# Patient Record
Sex: MALE | Race: OTHER | HISPANIC OR LATINO | URBAN - METROPOLITAN AREA
[De-identification: names, ages, dates, MRNs, and addresses within clinical notes are randomized per-mention and may not be internally consistent; named-entity substitution may affect disease eponyms.]

---

## 2017-01-09 ENCOUNTER — EMERGENCY (EMERGENCY)
Facility: HOSPITAL | Age: 52
LOS: 0 days | Discharge: HOME | End: 2017-01-09

## 2017-06-27 DIAGNOSIS — R10.32 LEFT LOWER QUADRANT PAIN: ICD-10-CM

## 2017-06-27 DIAGNOSIS — K57.92 DIVERTICULITIS OF INTESTINE, PART UNSPECIFIED, WITHOUT PERFORATION OR ABSCESS WITHOUT BLEEDING: ICD-10-CM

## 2017-08-26 ENCOUNTER — EMERGENCY (EMERGENCY)
Facility: HOSPITAL | Age: 52
LOS: 0 days | Discharge: HOME | End: 2017-08-26

## 2017-08-26 DIAGNOSIS — R10.32 LEFT LOWER QUADRANT PAIN: ICD-10-CM

## 2017-08-26 DIAGNOSIS — K57.32 DIVERTICULITIS OF LARGE INTESTINE WITHOUT PERFORATION OR ABSCESS WITHOUT BLEEDING: ICD-10-CM

## 2017-11-13 ENCOUNTER — EMERGENCY (EMERGENCY)
Facility: HOSPITAL | Age: 52
LOS: 0 days | Discharge: HOME | End: 2017-11-13

## 2017-11-13 DIAGNOSIS — K57.32 DIVERTICULITIS OF LARGE INTESTINE WITHOUT PERFORATION OR ABSCESS WITHOUT BLEEDING: ICD-10-CM

## 2017-11-13 DIAGNOSIS — R10.32 LEFT LOWER QUADRANT PAIN: ICD-10-CM

## 2019-08-07 ENCOUNTER — EMERGENCY (EMERGENCY)
Facility: HOSPITAL | Age: 54
LOS: 0 days | Discharge: HOME | End: 2019-08-07
Attending: EMERGENCY MEDICINE | Admitting: EMERGENCY MEDICINE
Payer: MEDICAID

## 2019-08-07 VITALS
HEART RATE: 79 BPM | DIASTOLIC BLOOD PRESSURE: 75 MMHG | SYSTOLIC BLOOD PRESSURE: 134 MMHG | OXYGEN SATURATION: 95 % | TEMPERATURE: 99 F | RESPIRATION RATE: 16 BRPM

## 2019-08-07 DIAGNOSIS — R30.0 DYSURIA: ICD-10-CM

## 2019-08-07 DIAGNOSIS — R10.9 UNSPECIFIED ABDOMINAL PAIN: ICD-10-CM

## 2019-08-07 DIAGNOSIS — R10.32 LEFT LOWER QUADRANT PAIN: ICD-10-CM

## 2019-08-07 LAB
ALBUMIN SERPL ELPH-MCNC: 4.3 G/DL — SIGNIFICANT CHANGE UP (ref 3.5–5.2)
ALP SERPL-CCNC: 100 U/L — SIGNIFICANT CHANGE UP (ref 30–115)
ALT FLD-CCNC: 68 U/L — HIGH (ref 0–41)
ANION GAP SERPL CALC-SCNC: 11 MMOL/L — SIGNIFICANT CHANGE UP (ref 7–14)
APPEARANCE UR: CLEAR — SIGNIFICANT CHANGE UP
AST SERPL-CCNC: 52 U/L — HIGH (ref 0–41)
BASOPHILS # BLD AUTO: 0.04 K/UL — SIGNIFICANT CHANGE UP (ref 0–0.2)
BASOPHILS NFR BLD AUTO: 0.7 % — SIGNIFICANT CHANGE UP (ref 0–1)
BILIRUB DIRECT SERPL-MCNC: <0.2 MG/DL — SIGNIFICANT CHANGE UP (ref 0–0.2)
BILIRUB INDIRECT FLD-MCNC: >0.1 MG/DL — LOW (ref 0.2–1.2)
BILIRUB SERPL-MCNC: 0.3 MG/DL — SIGNIFICANT CHANGE UP (ref 0.2–1.2)
BILIRUB UR-MCNC: NEGATIVE — SIGNIFICANT CHANGE UP
BUN SERPL-MCNC: 18 MG/DL — SIGNIFICANT CHANGE UP (ref 10–20)
CALCIUM SERPL-MCNC: 9 MG/DL — SIGNIFICANT CHANGE UP (ref 8.5–10.1)
CHLORIDE SERPL-SCNC: 106 MMOL/L — SIGNIFICANT CHANGE UP (ref 98–110)
CO2 SERPL-SCNC: 23 MMOL/L — SIGNIFICANT CHANGE UP (ref 17–32)
COLOR SPEC: SIGNIFICANT CHANGE UP
CREAT SERPL-MCNC: 0.9 MG/DL — SIGNIFICANT CHANGE UP (ref 0.7–1.5)
DIFF PNL FLD: NEGATIVE — SIGNIFICANT CHANGE UP
EOSINOPHIL # BLD AUTO: 0.18 K/UL — SIGNIFICANT CHANGE UP (ref 0–0.7)
EOSINOPHIL NFR BLD AUTO: 3.1 % — SIGNIFICANT CHANGE UP (ref 0–8)
GLUCOSE SERPL-MCNC: 110 MG/DL — HIGH (ref 70–99)
GLUCOSE UR QL: NEGATIVE — SIGNIFICANT CHANGE UP
HCT VFR BLD CALC: 43.5 % — SIGNIFICANT CHANGE UP (ref 42–52)
HGB BLD-MCNC: 15 G/DL — SIGNIFICANT CHANGE UP (ref 14–18)
IMM GRANULOCYTES NFR BLD AUTO: 0.3 % — SIGNIFICANT CHANGE UP (ref 0.1–0.3)
KETONES UR-MCNC: NEGATIVE — SIGNIFICANT CHANGE UP
LACTATE SERPL-SCNC: 1.4 MMOL/L — SIGNIFICANT CHANGE UP (ref 0.5–2.2)
LEUKOCYTE ESTERASE UR-ACNC: NEGATIVE — SIGNIFICANT CHANGE UP
LIDOCAIN IGE QN: 41 U/L — SIGNIFICANT CHANGE UP (ref 7–60)
LYMPHOCYTES # BLD AUTO: 1.79 K/UL — SIGNIFICANT CHANGE UP (ref 1.2–3.4)
LYMPHOCYTES # BLD AUTO: 31.2 % — SIGNIFICANT CHANGE UP (ref 20.5–51.1)
MCHC RBC-ENTMCNC: 30.9 PG — SIGNIFICANT CHANGE UP (ref 27–31)
MCHC RBC-ENTMCNC: 34.5 G/DL — SIGNIFICANT CHANGE UP (ref 32–37)
MCV RBC AUTO: 89.5 FL — SIGNIFICANT CHANGE UP (ref 80–94)
MONOCYTES # BLD AUTO: 0.45 K/UL — SIGNIFICANT CHANGE UP (ref 0.1–0.6)
MONOCYTES NFR BLD AUTO: 7.8 % — SIGNIFICANT CHANGE UP (ref 1.7–9.3)
NEUTROPHILS # BLD AUTO: 3.26 K/UL — SIGNIFICANT CHANGE UP (ref 1.4–6.5)
NEUTROPHILS NFR BLD AUTO: 56.9 % — SIGNIFICANT CHANGE UP (ref 42.2–75.2)
NITRITE UR-MCNC: NEGATIVE — SIGNIFICANT CHANGE UP
NRBC # BLD: 0 /100 WBCS — SIGNIFICANT CHANGE UP (ref 0–0)
PH UR: 6.5 — SIGNIFICANT CHANGE UP (ref 5–8)
PLATELET # BLD AUTO: 179 K/UL — SIGNIFICANT CHANGE UP (ref 130–400)
POTASSIUM SERPL-MCNC: 4.4 MMOL/L — SIGNIFICANT CHANGE UP (ref 3.5–5)
POTASSIUM SERPL-SCNC: 4.4 MMOL/L — SIGNIFICANT CHANGE UP (ref 3.5–5)
PROT SERPL-MCNC: 6.9 G/DL — SIGNIFICANT CHANGE UP (ref 6–8)
PROT UR-MCNC: NEGATIVE — SIGNIFICANT CHANGE UP
RBC # BLD: 4.86 M/UL — SIGNIFICANT CHANGE UP (ref 4.7–6.1)
RBC # FLD: 12.4 % — SIGNIFICANT CHANGE UP (ref 11.5–14.5)
SODIUM SERPL-SCNC: 140 MMOL/L — SIGNIFICANT CHANGE UP (ref 135–146)
SP GR SPEC: 1.01 — SIGNIFICANT CHANGE UP (ref 1.01–1.02)
UROBILINOGEN FLD QL: SIGNIFICANT CHANGE UP
WBC # BLD: 5.74 K/UL — SIGNIFICANT CHANGE UP (ref 4.8–10.8)
WBC # FLD AUTO: 5.74 K/UL — SIGNIFICANT CHANGE UP (ref 4.8–10.8)

## 2019-08-07 PROCEDURE — 74177 CT ABD & PELVIS W/CONTRAST: CPT | Mod: 26

## 2019-08-07 PROCEDURE — 99284 EMERGENCY DEPT VISIT MOD MDM: CPT

## 2019-08-07 RX ORDER — SODIUM CHLORIDE 9 MG/ML
1000 INJECTION INTRAMUSCULAR; INTRAVENOUS; SUBCUTANEOUS
Refills: 0 | Status: DISCONTINUED | OUTPATIENT
Start: 2019-08-07 | End: 2019-08-07

## 2019-08-07 RX ADMIN — SODIUM CHLORIDE 1000 MILLILITER(S): 9 INJECTION INTRAMUSCULAR; INTRAVENOUS; SUBCUTANEOUS at 19:45

## 2019-08-07 RX ADMIN — SODIUM CHLORIDE 250 MILLILITER(S): 9 INJECTION INTRAMUSCULAR; INTRAVENOUS; SUBCUTANEOUS at 16:17

## 2019-08-07 NOTE — ED PROVIDER NOTE - CLINICAL SUMMARY MEDICAL DECISION MAKING FREE TEXT BOX
pt with c/o lower abd pain waxing and waning for 1-2 weeks.  ua - neg.  labs reviewed, mild elevation of LFT's.  CT abd - no acute pathology, + diverticulosis, no diverticulitis, + hepatic steatosis. Pt told of results of labs and CT scan, pt told he needs to f/u with a GI doctor for elevated  lft's. also to f/u with his pmd and told to return to ER for any new/concerning symptoms. pt understands and agrees with plan.    *Above explained to pt with Turkish translation from [place]  # [id number]

## 2019-08-07 NOTE — ED ADULT NURSE REASSESSMENT NOTE - NS ED NURSE REASSESS COMMENT FT1
Patient assessed, abdominal pain improved but not resolved. Patient awaiting CT results. Review of systems WDL.

## 2019-08-07 NOTE — ED PROVIDER NOTE - NS ED ROS FT
Constitutional: no fever, chills, no recent weight loss, change in appetite or malaise  Cardiac: No chest pain, SOB or edema.  Respiratory: No cough or respiratory distress  GI: + lower abdominal pain. No nausea, vomiting, diarrhea  : + dysuria. No frequency, urgency or hematuria  MS: no pain to back or extremities, no loss of ROM, no weakness  Neuro: No headache or weakness. No LOC.  Skin: No skin rash.  Except as documented in the HPI, all other systems are negative.

## 2019-08-07 NOTE — ED PROVIDER NOTE - OBJECTIVE STATEMENT
54 year old M with no pmhx c/o lower abdominal pain x 1 week. Sts the pain is worse when he urinates. The pain is intermittent and worse after eating. Denies any nausea, vomiting, decreased po intake, diarrhea, hematuria, chest pain, sob, past surgical hx, rectal bleeding.

## 2019-08-07 NOTE — ED ADULT TRIAGE NOTE - CHIEF COMPLAINT QUOTE
lower abdominal pain x1 week associated with dysuria lower abdominal pain x1 week associated with dysuria. No N/V/D

## 2019-08-07 NOTE — ED PROVIDER NOTE - PHYSICAL EXAMINATION
CONSTITUTIONAL: Well-appearing; well-nourished; in no apparent distress.   NECK: Supple; non-tender; no cervical lymphadenopathy.   CARDIOVASCULAR: Normal S1, S2; no murmurs, rubs, or gallops.   RESPIRATORY: Normal chest excursion with respiration; breath sounds clear and equal bilaterally; no wheezes, rhonchi, or rales.  GI/: Normal bowel sounds; non-distended; non-tender; No rebound or guarding. No CVA ttp  MS: No evidence of trauma or deformity. Normal ROM in all four extremities; non-tender to palpation; distal pulses are normal.   SKIN: Normal for age and race; warm; dry; good turgor; no apparent lesions or exudate.   NEURO/PSYCH: A & O x 4; grossly unremarkable. mood and manner are appropriate.

## 2019-08-07 NOTE — ED PROVIDER NOTE - ATTENDING CONTRIBUTION TO CARE
55 y/o male with no h/o diverticulitis in ER with c/o lower abd pain x 1-2 weeks.  waxning and waning, dull pain to LLQ.  no radiation.  states his lower abdomen hurts when he urinates, but no burning, no frequency, no hematuria, no penile d/c, no pain/swelling to testicles.  no flank pain.  no n/v/d  no f/c.  pt sexually active with wife only.  pt states had similar episode ~ 1 yr ago and had diverticulitis.  No cp/sob.  no ha/dizziness/loc.  PE - nad, nc/at, eomi, perrl, op - clear, cta b/l, no w/r/r, rrr, abd - soft, mild lower abd tenderness, no guarding/rebound, nabs, no cvat, gu - no penile or testicular swelling or tenderness, no penile d/c, from x 4, A&O x 3, no focal neuro deficits.  -check labs, ua, ct scan, re-eval.

## 2019-08-07 NOTE — ED PROVIDER NOTE - CARE PROVIDER_API CALL
Tushar Buchanan)  Gastroenterology  [address] [place] [address]  Phone: [phone]  Fax: [phone]  Follow Up Time:

## 2019-08-08 LAB
CULTURE RESULTS: SIGNIFICANT CHANGE UP
SPECIMEN SOURCE: SIGNIFICANT CHANGE UP

## 2020-07-10 ENCOUNTER — APPOINTMENT (OUTPATIENT)
Dept: OTOLARYNGOLOGY | Facility: CLINIC | Age: 55
End: 2020-07-10
Payer: SUBSIDIZED

## 2020-07-10 DIAGNOSIS — H61.23 IMPACTED CERUMEN, BILATERAL: ICD-10-CM

## 2020-07-10 PROCEDURE — 92550 TYMPANOMETRY & REFLEX THRESH: CPT

## 2020-07-10 PROCEDURE — G0268 REMOVAL OF IMPACTED WAX MD: CPT

## 2020-07-10 PROCEDURE — 92557 COMPREHENSIVE HEARING TEST: CPT

## 2020-07-10 PROCEDURE — 99203 OFFICE O/P NEW LOW 30 MIN: CPT | Mod: 25

## 2020-07-10 NOTE — ASSESSMENT
[FreeTextEntry1] : Audiogram reviewed and discussed. \par \par Discussed hearing loss and noise protection.\par \par RTC for re-audio in 4M.\par

## 2020-07-10 NOTE — PHYSICAL EXAM
[Midline] : trachea located in midline position [Normal] : no rashes [de-identified] : bilateral cerumen impaction

## 2020-07-10 NOTE — HISTORY OF PRESENT ILLNESS
[de-identified] : Patient presents today with c/o hearing loss.He noticed hearing loss about 2 months ago. Patient denies any otalgia. No tinnitus. He admits right ear is worse. No pain.

## 2020-07-24 ENCOUNTER — OUTPATIENT (OUTPATIENT)
Dept: OUTPATIENT SERVICES | Facility: HOSPITAL | Age: 55
LOS: 1 days | Discharge: HOME | End: 2020-07-24

## 2020-07-24 ENCOUNTER — APPOINTMENT (OUTPATIENT)
Dept: INTERNAL MEDICINE | Facility: CLINIC | Age: 55
End: 2020-07-24
Payer: COMMERCIAL

## 2020-07-24 VITALS
HEART RATE: 63 BPM | BODY MASS INDEX: 20.22 KG/M2 | TEMPERATURE: 96.1 F | DIASTOLIC BLOOD PRESSURE: 73 MMHG | SYSTOLIC BLOOD PRESSURE: 128 MMHG | HEIGHT: 76 IN | WEIGHT: 166 LBS

## 2020-07-24 DIAGNOSIS — H54.7 UNSPECIFIED VISUAL LOSS: ICD-10-CM

## 2020-07-24 DIAGNOSIS — Z78.9 OTHER SPECIFIED HEALTH STATUS: ICD-10-CM

## 2020-07-24 PROCEDURE — 99204 OFFICE O/P NEW MOD 45 MIN: CPT | Mod: GC

## 2020-07-25 LAB
25(OH)D3 SERPL-MCNC: 25 NG/ML
ALBUMIN SERPL ELPH-MCNC: 4.5 G/DL
ALP BLD-CCNC: 99 U/L
ALT SERPL-CCNC: 150 U/L
ANION GAP SERPL CALC-SCNC: 14 MMOL/L
AST SERPL-CCNC: 89 U/L
BASOPHILS # BLD AUTO: 0.03 K/UL
BASOPHILS NFR BLD AUTO: 0.8 %
BILIRUB SERPL-MCNC: 0.6 MG/DL
BUN SERPL-MCNC: 19 MG/DL
CALCIUM SERPL-MCNC: 9.1 MG/DL
CHLORIDE SERPL-SCNC: 103 MMOL/L
CHOLEST SERPL-MCNC: 221 MG/DL
CHOLEST/HDLC SERPL: 4.2 RATIO
CO2 SERPL-SCNC: 23 MMOL/L
CREAT SERPL-MCNC: 1 MG/DL
EOSINOPHIL # BLD AUTO: 0.14 K/UL
EOSINOPHIL NFR BLD AUTO: 3.7 %
ESTIMATED AVERAGE GLUCOSE: 128 MG/DL
GLUCOSE SERPL-MCNC: 118 MG/DL
HBA1C MFR BLD HPLC: 6.1 %
HCT VFR BLD CALC: 46.4 %
HDLC SERPL-MCNC: 53 MG/DL
HGB BLD-MCNC: 15.6 G/DL
IMM GRANULOCYTES NFR BLD AUTO: 0 %
LDLC SERPL CALC-MCNC: 166 MG/DL
LYMPHOCYTES # BLD AUTO: 1.63 K/UL
LYMPHOCYTES NFR BLD AUTO: 42.6 %
MAN DIFF?: NORMAL
MCHC RBC-ENTMCNC: 30.4 PG
MCHC RBC-ENTMCNC: 33.6 G/DL
MCV RBC AUTO: 90.3 FL
MONOCYTES # BLD AUTO: 0.27 K/UL
MONOCYTES NFR BLD AUTO: 7 %
NEUTROPHILS # BLD AUTO: 1.76 K/UL
NEUTROPHILS NFR BLD AUTO: 45.9 %
PLATELET # BLD AUTO: 178 K/UL
POTASSIUM SERPL-SCNC: 4.2 MMOL/L
PROT SERPL-MCNC: 6.8 G/DL
RBC # BLD: 5.14 M/UL
RBC # FLD: 12.7 %
SODIUM SERPL-SCNC: 140 MMOL/L
TRIGL SERPL-MCNC: 177 MG/DL
TSH SERPL-ACNC: 1.55 UIU/ML
WBC # FLD AUTO: 3.83 K/UL

## 2020-07-25 NOTE — PHYSICAL EXAM
[Well Nourished] : well nourished [No Acute Distress] : no acute distress [Normal Sclera/Conjunctiva] : normal sclera/conjunctiva [Well-Appearing] : well-appearing [Well Developed] : well developed [PERRL] : pupils equal round and reactive to light [EOMI] : extraocular movements intact [Normal Outer Ear/Nose] : the outer ears and nose were normal in appearance [No JVD] : no jugular venous distention [Normal Oropharynx] : the oropharynx was normal [No Lymphadenopathy] : no lymphadenopathy [Thyroid Normal, No Nodules] : the thyroid was normal and there were no nodules present [Supple] : supple [No Respiratory Distress] : no respiratory distress  [No Accessory Muscle Use] : no accessory muscle use [Clear to Auscultation] : lungs were clear to auscultation bilaterally [Normal Rate] : normal rate  [Normal S1, S2] : normal S1 and S2 [Regular Rhythm] : with a regular rhythm [No Murmur] : no murmur heard [No Carotid Bruits] : no carotid bruits [No Abdominal Bruit] : a ~M bruit was not heard ~T in the abdomen [Pedal Pulses Present] : the pedal pulses are present [No Varicosities] : no varicosities [No Palpable Aorta] : no palpable aorta [No Edema] : there was no peripheral edema [Soft] : abdomen soft [No Extremity Clubbing/Cyanosis] : no extremity clubbing/cyanosis [Non Tender] : non-tender [Non-distended] : non-distended [No HSM] : no HSM [No Masses] : no abdominal mass palpated [Normal Bowel Sounds] : normal bowel sounds [Normal Posterior Cervical Nodes] : no posterior cervical lymphadenopathy [No Spinal Tenderness] : no spinal tenderness [No CVA Tenderness] : no CVA  tenderness [Normal Anterior Cervical Nodes] : no anterior cervical lymphadenopathy [No Joint Swelling] : no joint swelling [Grossly Normal Strength/Tone] : grossly normal strength/tone [No Rash] : no rash [No Focal Deficits] : no focal deficits [Coordination Grossly Intact] : coordination grossly intact [Deep Tendon Reflexes (DTR)] : deep tendon reflexes were 2+ and symmetric [Normal Gait] : normal gait [Normal Insight/Judgement] : insight and judgment were intact [Normal Affect] : the affect was normal [de-identified] : Visual loss in the right EYE

## 2020-07-25 NOTE — HISTORY OF PRESENT ILLNESS
[FreeTextEntry1] : establish care [de-identified] : 56 yo male with no PMH comes to the office to establish care.\par complains of urinary symptoms such as straining, thin stream, multiple nighttime awakening. No weight loss, no ca in family; no fever, burning, of flank pain or pus or blood in the urine.\par he also complains of decrease in vision in the right eye for last 3-4 years, gradual painless visual loss., no trauma or infection history.

## 2020-07-25 NOTE — REVIEW OF SYSTEMS
[Hesitancy] : hesitancy [Vision Problems] : vision problems [Hearing Loss] : hearing loss [Frequency] : frequency [Nocturia] : nocturia [Negative] : Heme/Lymph [FreeTextEntry3] : rt eye visual problem [FreeTextEntry4] : b/l cerumen

## 2020-07-25 NOTE — ASSESSMENT
[FreeTextEntry1] : Lower urinary tract symptoms:\par likely BPH\par get ua, US prostate\par start on tamsulosin\par \par Rt eye visual problem:\par painless, gradual\par needs ophthalmology eval\par \par HCM:\par routine labs\par f/u in 6 months

## 2020-07-30 DIAGNOSIS — H54.7 UNSPECIFIED VISUAL LOSS: ICD-10-CM

## 2020-07-30 DIAGNOSIS — Z00.00 ENCOUNTER FOR GENERAL ADULT MEDICAL EXAMINATION WITHOUT ABNORMAL FINDINGS: ICD-10-CM

## 2020-07-30 DIAGNOSIS — Z78.9 OTHER SPECIFIED HEALTH STATUS: ICD-10-CM

## 2020-07-30 DIAGNOSIS — R39.9 UNSPECIFIED SYMPTOMS AND SIGNS INVOLVING THE GENITOURINARY SYSTEM: ICD-10-CM

## 2020-08-07 ENCOUNTER — OUTPATIENT (OUTPATIENT)
Dept: OUTPATIENT SERVICES | Facility: HOSPITAL | Age: 55
LOS: 1 days | Discharge: HOME | End: 2020-08-07
Payer: SUBSIDIZED

## 2020-08-07 PROCEDURE — 92134 CPTRZ OPH DX IMG PST SGM RTA: CPT | Mod: 26

## 2020-08-07 PROCEDURE — 92202 OPSCPY EXTND ON/MAC DRAW: CPT

## 2020-08-07 PROCEDURE — 92004 COMPRE OPH EXAM NEW PT 1/>: CPT

## 2020-08-21 ENCOUNTER — OUTPATIENT (OUTPATIENT)
Dept: OUTPATIENT SERVICES | Facility: HOSPITAL | Age: 55
LOS: 1 days | Discharge: HOME | End: 2020-08-21
Payer: SUBSIDIZED

## 2020-08-21 PROCEDURE — 92012 INTRM OPH EXAM EST PATIENT: CPT

## 2020-11-13 ENCOUNTER — APPOINTMENT (OUTPATIENT)
Dept: OTOLARYNGOLOGY | Facility: CLINIC | Age: 55
End: 2020-11-13

## 2020-11-13 ENCOUNTER — APPOINTMENT (OUTPATIENT)
Dept: OTOLARYNGOLOGY | Facility: CLINIC | Age: 55
End: 2020-11-13
Payer: SUBSIDIZED

## 2020-11-13 ENCOUNTER — OUTPATIENT (OUTPATIENT)
Dept: OUTPATIENT SERVICES | Facility: HOSPITAL | Age: 55
LOS: 1 days | Discharge: HOME | End: 2020-11-13
Payer: SUBSIDIZED

## 2020-11-13 ENCOUNTER — APPOINTMENT (OUTPATIENT)
Dept: OPHTHALMOLOGY | Facility: CLINIC | Age: 55
End: 2020-11-13

## 2020-11-13 VITALS — TEMPERATURE: 97.7 F

## 2020-11-13 DIAGNOSIS — H91.93 UNSPECIFIED HEARING LOSS, BILATERAL: ICD-10-CM

## 2020-11-13 PROCEDURE — 92134 CPTRZ OPH DX IMG PST SGM RTA: CPT | Mod: 26

## 2020-11-13 PROCEDURE — 99213 OFFICE O/P EST LOW 20 MIN: CPT | Mod: 25

## 2020-11-13 PROCEDURE — 92550 TYMPANOMETRY & REFLEX THRESH: CPT

## 2020-11-13 PROCEDURE — 92202 OPSCPY EXTND ON/MAC DRAW: CPT

## 2020-11-13 PROCEDURE — 92014 COMPRE OPH EXAM EST PT 1/>: CPT

## 2020-11-13 PROCEDURE — 92557 COMPREHENSIVE HEARING TEST: CPT

## 2020-11-13 NOTE — HISTORY OF PRESENT ILLNESS
[de-identified] : Patient presents today with c/o hearing loss.He noticed hearing loss about 2 months ago. Patient denies any otalgia. No tinnitus. He admits right ear is worse. No pain.  [FreeTextEntry1] : \par 11/13/2020: Patient following up on bilateral hearing loss. Patient denies otalgia. No change in hearing. He notices people have to talk louder so he understands. no ear pain, but feels clogged on both sides, for a few weeks now.

## 2020-11-13 NOTE — REASON FOR VISIT
[Subsequent Evaluation] : a subsequent evaluation for [FreeTextEntry2] : clogged ears, hearing loss

## 2020-11-25 DIAGNOSIS — H26.9 UNSPECIFIED CATARACT: ICD-10-CM

## 2020-11-25 DIAGNOSIS — H11.003 UNSPECIFIED PTERYGIUM OF EYE, BILATERAL: ICD-10-CM

## 2020-11-25 DIAGNOSIS — H53.041 AMBLYOPIA SUSPECT, RIGHT EYE: ICD-10-CM

## 2020-12-18 ENCOUNTER — APPOINTMENT (OUTPATIENT)
Dept: OPHTHALMOLOGY | Facility: CLINIC | Age: 55
End: 2020-12-18

## 2020-12-18 ENCOUNTER — OUTPATIENT (OUTPATIENT)
Dept: OUTPATIENT SERVICES | Facility: HOSPITAL | Age: 55
LOS: 1 days | Discharge: HOME | End: 2020-12-18
Payer: SUBSIDIZED

## 2020-12-18 PROCEDURE — 92012 INTRM OPH EXAM EST PATIENT: CPT

## 2020-12-18 PROCEDURE — 92134 CPTRZ OPH DX IMG PST SGM RTA: CPT | Mod: 26

## 2020-12-18 PROCEDURE — 92202 OPSCPY EXTND ON/MAC DRAW: CPT

## 2020-12-24 DIAGNOSIS — H11.003 UNSPECIFIED PTERYGIUM OF EYE, BILATERAL: ICD-10-CM

## 2020-12-24 DIAGNOSIS — H26.9 UNSPECIFIED CATARACT: ICD-10-CM

## 2020-12-24 DIAGNOSIS — H35.721 SEROUS DETACHMENT OF RETINAL PIGMENT EPITHELIUM, RIGHT EYE: ICD-10-CM

## 2021-01-15 ENCOUNTER — OUTPATIENT (OUTPATIENT)
Dept: OUTPATIENT SERVICES | Facility: HOSPITAL | Age: 56
LOS: 1 days | Discharge: HOME | End: 2021-01-15

## 2021-01-15 ENCOUNTER — APPOINTMENT (OUTPATIENT)
Dept: INTERNAL MEDICINE | Facility: CLINIC | Age: 56
End: 2021-01-15
Payer: COMMERCIAL

## 2021-01-15 VITALS
WEIGHT: 164 LBS | DIASTOLIC BLOOD PRESSURE: 74 MMHG | SYSTOLIC BLOOD PRESSURE: 122 MMHG | TEMPERATURE: 96 F | HEART RATE: 56 BPM | BODY MASS INDEX: 28 KG/M2 | OXYGEN SATURATION: 98 % | HEIGHT: 64 IN

## 2021-01-15 DIAGNOSIS — M54.9 DORSALGIA, UNSPECIFIED: ICD-10-CM

## 2021-01-15 DIAGNOSIS — N40.1 BENIGN PROSTATIC HYPERPLASIA WITH LOWER URINARY TRACT SYMPTOMS: ICD-10-CM

## 2021-01-15 DIAGNOSIS — Z00.00 ENCOUNTER FOR GENERAL ADULT MEDICAL EXAMINATION WITHOUT ABNORMAL FINDINGS: ICD-10-CM

## 2021-01-15 PROCEDURE — 99213 OFFICE O/P EST LOW 20 MIN: CPT | Mod: GC

## 2021-01-15 RX ORDER — NAPROXEN 500 MG/1
500 TABLET ORAL
Qty: 60 | Refills: 2 | Status: ACTIVE | COMMUNITY
Start: 2021-01-15 | End: 1900-01-01

## 2021-01-21 ENCOUNTER — APPOINTMENT (OUTPATIENT)
Dept: UROLOGY | Facility: CLINIC | Age: 56
End: 2021-01-21
Payer: SUBSIDIZED

## 2021-01-21 ENCOUNTER — OUTPATIENT (OUTPATIENT)
Dept: OUTPATIENT SERVICES | Facility: HOSPITAL | Age: 56
LOS: 1 days | Discharge: HOME | End: 2021-01-21

## 2021-01-21 ENCOUNTER — RESULT REVIEW (OUTPATIENT)
Age: 56
End: 2021-01-21

## 2021-01-21 ENCOUNTER — OUTPATIENT (OUTPATIENT)
Dept: OUTPATIENT SERVICES | Facility: HOSPITAL | Age: 56
LOS: 1 days | Discharge: HOME | End: 2021-01-21
Payer: SUBSIDIZED

## 2021-01-21 DIAGNOSIS — N40.1 BENIGN PROSTATIC HYPERPLASIA WITH LOWER URINARY TRACT SYMPTOMS: ICD-10-CM

## 2021-01-21 PROCEDURE — 76857 US EXAM PELVIC LIMITED: CPT | Mod: 26

## 2021-01-21 PROCEDURE — 99203 OFFICE O/P NEW LOW 30 MIN: CPT

## 2021-01-21 RX ORDER — FINASTERIDE 1 MG/1
1 TABLET ORAL DAILY
Qty: 30 | Refills: 5 | Status: DISCONTINUED | COMMUNITY
Start: 2021-01-15 | End: 2021-01-21

## 2021-01-21 NOTE — HISTORY OF PRESENT ILLNESS
[FreeTextEntry1] : This is a 55 year male who has had slow and weak urination for the last year\par states that wakes 3-4 times per night and also frequency in the daytime\par \par Dr Longo started him on tamsulosin -- stronger stream but still wakes up too often at night\par \par no pain with urination or blood in the urine\par \par sonogram done today Jan 2021-- prostate is 52g, PVR 90ml

## 2021-01-22 DIAGNOSIS — N40.1 BENIGN PROSTATIC HYPERPLASIA WITH LOWER URINARY TRACT SYMPTOMS: ICD-10-CM

## 2021-01-22 DIAGNOSIS — R39.9 UNSPECIFIED SYMPTOMS AND SIGNS INVOLVING THE GENITOURINARY SYSTEM: ICD-10-CM

## 2021-02-05 ENCOUNTER — APPOINTMENT (OUTPATIENT)
Dept: OPHTHALMOLOGY | Facility: CLINIC | Age: 56
End: 2021-02-05

## 2021-02-05 ENCOUNTER — OUTPATIENT (OUTPATIENT)
Dept: OUTPATIENT SERVICES | Facility: HOSPITAL | Age: 56
LOS: 1 days | Discharge: HOME | End: 2021-02-05
Payer: MEDICAID

## 2021-02-05 DIAGNOSIS — H11.003 UNSPECIFIED PTERYGIUM OF EYE, BILATERAL: ICD-10-CM

## 2021-02-05 DIAGNOSIS — H26.9 UNSPECIFIED CATARACT: ICD-10-CM

## 2021-02-05 DIAGNOSIS — H52.7 UNSPECIFIED DISORDER OF REFRACTION: ICD-10-CM

## 2021-02-05 PROCEDURE — 99214 OFFICE O/P EST MOD 30 MIN: CPT

## 2021-02-07 NOTE — REVIEW OF SYSTEMS
[Back Pain] : back pain [Fever] : no fever [Chills] : no chills [Pain] : no pain [Sore Throat] : no sore throat [Chest Pain] : no chest pain [Palpitations] : no palpitations [Lower Ext Edema] : no lower extremity edema [Shortness Of Breath] : no shortness of breath [Wheezing] : no wheezing [Cough] : no cough [Abdominal Pain] : no abdominal pain [Nausea] : no nausea [Vomiting] : no vomiting

## 2021-02-07 NOTE — ASSESSMENT
[FreeTextEntry1] : 56 yo M with PMH of BPH and right-sided cataract presents for follow up.\par \par #BPH\par - Symptoms somewhat improved but still waking up at night with need to urinate\par - C/w tamsulosin\par - Add finasteride\par - UA negative\par - Urology eval\par \par #Lower back pain, with radiation down left leg\par - Likely sciatica\par - Naproxen 500mg BID PRN\par - Denies precipitating factors\par - Continue regular physical activity\par - Physical therapy eval if pain persists for more than 1 month\par \par #Cataract, right-sided\par - Following with ophthalmology for possible cataract removal\par \par #Hearing problems\par - Evaluated by ENT and received audiogram with no abnormal findings\par \par #Elevated LFT's\par - Get RUQ U/S\par \par #A1C 6.1%\par #Hypercholesterolemia\par - Recommend dietary and lifestyle modifications\par - Repeat labs 1 week prior to next follow up visit: CBC, CMP, lipid profile, TSH, A1C, \par \par #HCM\par - Last colonoscopy 2 years ago -> normal, as per patient\par - States that he received his flu vaccine last year\par - F/u in 6 months

## 2021-02-07 NOTE — PHYSICAL EXAM
[No Acute Distress] : no acute distress [Well Nourished] : well nourished [Well-Appearing] : well-appearing [Normal Sclera/Conjunctiva] : normal sclera/conjunctiva [Supple] : supple [No Respiratory Distress] : no respiratory distress  [No Accessory Muscle Use] : no accessory muscle use [Clear to Auscultation] : lungs were clear to auscultation bilaterally [Normal Rate] : normal rate  [Regular Rhythm] : with a regular rhythm [Normal S1, S2] : normal S1 and S2 [No Edema] : there was no peripheral edema [Soft] : abdomen soft [Non Tender] : non-tender [Speech Grossly Normal] : speech grossly normal [Normal Affect] : the affect was normal [Alert and Oriented x3] : oriented to person, place, and time [Normal Mood] : the mood was normal [Normal Insight/Judgement] : insight and judgment were intact [de-identified] : positive straight leg raise on the left

## 2021-02-07 NOTE — HISTORY OF PRESENT ILLNESS
[FreeTextEntry1] : [id number] [TWNoteComboBox1] : Sudanese [de-identified] : 56 yo M with PMH of BPH and right-sided cataract presents for follow up.\par \par Complains of lower back pain for the last 20 days. Denies inciting events. Has not been taking any meds for the back pain. Says he woke up one morning with the pain. Lower back pain affects both sides, but radiates down to the left foot only. Denies numbness or tingling in the left leg. Denies injury to the back or activities that put a lot of strain on the back. Positive straight leg raise on the left.\par \par Prescribed tamsulosin for urinary symptoms (straining, multiple nighttime awakenings). States that the tamsulosin helped him somewhat. Patient states that he was still having urinary symptoms (would wake up only 2 times per night instead of 3-5 times per night to go to the bathroom).\par \par Evaluated by ophthalmology. Patient states that he will be having cataract surgery on right eye at some point, as per ophthalmology.\par \par Evaluated by ENT for hearing loss. No acute findings.\par \par No other complaints.

## 2021-02-26 ENCOUNTER — OUTPATIENT (OUTPATIENT)
Dept: OUTPATIENT SERVICES | Facility: HOSPITAL | Age: 56
LOS: 1 days | Discharge: HOME | End: 2021-02-26
Payer: MEDICAID

## 2021-02-26 ENCOUNTER — APPOINTMENT (OUTPATIENT)
Dept: OPHTHALMOLOGY | Facility: CLINIC | Age: 56
End: 2021-02-26

## 2021-02-26 PROCEDURE — 92014 COMPRE OPH EXAM EST PT 1/>: CPT

## 2021-03-02 DIAGNOSIS — H26.9 UNSPECIFIED CATARACT: ICD-10-CM

## 2021-03-02 DIAGNOSIS — H53.041 AMBLYOPIA SUSPECT, RIGHT EYE: ICD-10-CM

## 2021-03-02 DIAGNOSIS — H11.003 UNSPECIFIED PTERYGIUM OF EYE, BILATERAL: ICD-10-CM

## 2021-05-17 ENCOUNTER — APPOINTMENT (OUTPATIENT)
Dept: OTOLARYNGOLOGY | Facility: CLINIC | Age: 56
End: 2021-05-17

## 2021-05-20 ENCOUNTER — APPOINTMENT (OUTPATIENT)
Dept: UROLOGY | Facility: CLINIC | Age: 56
End: 2021-05-20
Payer: COMMERCIAL

## 2021-05-20 ENCOUNTER — OUTPATIENT (OUTPATIENT)
Dept: OUTPATIENT SERVICES | Facility: HOSPITAL | Age: 56
LOS: 1 days | Discharge: HOME | End: 2021-05-20

## 2021-05-20 VITALS — TEMPERATURE: 96.4 F | DIASTOLIC BLOOD PRESSURE: 71 MMHG | SYSTOLIC BLOOD PRESSURE: 133 MMHG | HEART RATE: 60 BPM

## 2021-05-20 DIAGNOSIS — R35.0 FREQUENCY OF MICTURITION: ICD-10-CM

## 2021-05-20 DIAGNOSIS — N40.1 BENIGN PROSTATIC HYPERPLASIA WITH LOWER URINARY TRACT SYMPTOMS: ICD-10-CM

## 2021-05-20 DIAGNOSIS — R35.1 NOCTURIA: ICD-10-CM

## 2021-05-20 PROCEDURE — 99213 OFFICE O/P EST LOW 20 MIN: CPT

## 2021-05-20 NOTE — ASSESSMENT
[FreeTextEntry1] : This is a 55 year male who has had slow and weak urination for the last year\par states that wakes 3-4 times per night and also frequency in the daytime\par \par Dr Longo started him on tamsulosin -- stronger stream but still wakes up too often at night\par i started on finasteride last visit\par \par no pain with urination or blood in the urine\par \par sonogram done today Jan 2021-- prostate is 52g, PVR 90ml. \par

## 2021-07-13 ENCOUNTER — OUTPATIENT (OUTPATIENT)
Dept: OUTPATIENT SERVICES | Facility: HOSPITAL | Age: 56
LOS: 1 days | Discharge: HOME | End: 2021-07-13

## 2021-07-13 ENCOUNTER — APPOINTMENT (OUTPATIENT)
Dept: INTERNAL MEDICINE | Facility: CLINIC | Age: 56
End: 2021-07-13
Payer: COMMERCIAL

## 2021-07-13 VITALS
WEIGHT: 166 LBS | TEMPERATURE: 97.2 F | OXYGEN SATURATION: 98 % | HEART RATE: 54 BPM | DIASTOLIC BLOOD PRESSURE: 88 MMHG | HEIGHT: 64 IN | BODY MASS INDEX: 28.34 KG/M2 | SYSTOLIC BLOOD PRESSURE: 145 MMHG

## 2021-07-13 DIAGNOSIS — R20.0 ANESTHESIA OF SKIN: ICD-10-CM

## 2021-07-13 DIAGNOSIS — H26.9 UNSPECIFIED CATARACT: ICD-10-CM

## 2021-07-13 DIAGNOSIS — Z00.00 ENCOUNTER FOR GENERAL ADULT MEDICAL EXAMINATION W/OUT ABNORMAL FINDINGS: ICD-10-CM

## 2021-07-13 DIAGNOSIS — Z91.89 OTHER SPECIFIED PERSONAL RISK FACTORS, NOT ELSEWHERE CLASSIFIED: ICD-10-CM

## 2021-07-13 PROCEDURE — 99214 OFFICE O/P EST MOD 30 MIN: CPT | Mod: GC

## 2021-07-14 DIAGNOSIS — H26.9 UNSPECIFIED CATARACT: ICD-10-CM

## 2021-07-14 DIAGNOSIS — Z91.89 OTHER SPECIFIED PERSONAL RISK FACTORS, NOT ELSEWHERE CLASSIFIED: ICD-10-CM

## 2021-07-14 DIAGNOSIS — Z00.00 ENCOUNTER FOR GENERAL ADULT MEDICAL EXAMINATION WITHOUT ABNORMAL FINDINGS: ICD-10-CM

## 2021-07-14 DIAGNOSIS — R20.0 ANESTHESIA OF SKIN: ICD-10-CM

## 2021-07-14 NOTE — HISTORY OF PRESENT ILLNESS
[FreeTextEntry1] : [id number] [FreeTextEntry2] : Angel [de-identified] : 56 y.o male present for routine medical follow up review of blood work.  He c/o numbness, paresthesia , and weakness in both hands. Patient  is otherwise stable.

## 2021-07-14 NOTE — PHYSICAL EXAM
[No Acute Distress] : no acute distress [Well-Appearing] : well-appearing [Normal Sclera/Conjunctiva] : normal sclera/conjunctiva [Normal Outer Ear/Nose] : the outer ears and nose were normal in appearance [No JVD] : no jugular venous distention [No Respiratory Distress] : no respiratory distress  [Normal Rate] : normal rate  [Regular Rhythm] : with a regular rhythm [No Edema] : there was no peripheral edema [Soft] : abdomen soft [Non Tender] : non-tender [Non-distended] : non-distended [No CVA Tenderness] : no CVA  tenderness [No Joint Swelling] : no joint swelling [No Rash] : no rash

## 2021-07-14 NOTE — ASSESSMENT
[FreeTextEntry1] : 56 y.o male present for routine medical follow up review of blood work.  He c/o numbness, paresthesia , and weakness in both hands. Patient  is otherwise stable. \par \par \par # prediabetes\par - 6.1 % \par - counseled  on diet management and exercise \par - RTC in 3 months. \par \par \par # hyperlipidemia \par -  , Total cholesterol 268\par - patient wants to try diet and lifestyle changes \par - counseled on diet management \par - RTC in 3 months to re- evaluate \par \par # elevated liver enzymes \par - counseled on diet and lifestyle management  \par \par # cataracts , right sided \par - referred back to opthalmology for possible cataract removal \par \par # HCM\par - colonoscopy 2019 , normal \par - flu vaccine , received last year \par - f/u in 6 months

## 2021-07-16 ENCOUNTER — APPOINTMENT (OUTPATIENT)
Dept: UROLOGY | Facility: CLINIC | Age: 56
End: 2021-07-16
Payer: SUBSIDIZED

## 2021-07-16 VITALS — BODY MASS INDEX: 28.34 KG/M2 | HEIGHT: 64 IN | WEIGHT: 166 LBS

## 2021-07-16 PROCEDURE — 51741 ELECTRO-UROFLOWMETRY FIRST: CPT

## 2021-07-16 PROCEDURE — 51797 INTRAABDOMINAL PRESSURE TEST: CPT

## 2021-07-16 PROCEDURE — 51728 CYSTOMETROGRAM W/VP: CPT

## 2021-07-16 PROCEDURE — 99072 ADDL SUPL MATRL&STAF TM PHE: CPT

## 2021-07-16 PROCEDURE — 51784 ANAL/URINARY MUSCLE STUDY: CPT

## 2021-07-16 RX ORDER — CEFPODOXIME PROXETIL 200 MG/1
200 TABLET, FILM COATED ORAL
Qty: 6 | Refills: 0 | Status: ACTIVE | COMMUNITY
Start: 2021-07-16 | End: 1900-01-01

## 2021-07-23 ENCOUNTER — APPOINTMENT (OUTPATIENT)
Dept: UROLOGY | Facility: CLINIC | Age: 56
End: 2021-07-23
Payer: SUBSIDIZED

## 2021-07-23 VITALS — WEIGHT: 166 LBS | BODY MASS INDEX: 28.34 KG/M2 | HEIGHT: 64 IN

## 2021-07-23 DIAGNOSIS — R39.9 UNSPECIFIED SYMPTOMS AND SIGNS INVOLVING THE GENITOURINARY SYSTEM: ICD-10-CM

## 2021-07-23 DIAGNOSIS — R35.1 NOCTURIA: ICD-10-CM

## 2021-07-23 DIAGNOSIS — R35.1 BENIGN PROSTATIC HYPERPLASIA WITH LOWER URINARY TRACT SYMPMS: ICD-10-CM

## 2021-07-23 DIAGNOSIS — N40.1 BENIGN PROSTATIC HYPERPLASIA WITH LOWER URINARY TRACT SYMPMS: ICD-10-CM

## 2021-07-23 DIAGNOSIS — R35.0 FREQUENCY OF MICTURITION: ICD-10-CM

## 2021-07-23 PROCEDURE — 99072 ADDL SUPL MATRL&STAF TM PHE: CPT

## 2021-07-23 PROCEDURE — 99213 OFFICE O/P EST LOW 20 MIN: CPT

## 2021-07-23 RX ORDER — FINASTERIDE 5 MG/1
5 TABLET, FILM COATED ORAL DAILY
Qty: 90 | Refills: 3 | Status: ACTIVE | COMMUNITY
Start: 2021-01-21 | End: 1900-01-01

## 2021-07-23 RX ORDER — TAMSULOSIN HYDROCHLORIDE 0.4 MG/1
0.4 CAPSULE ORAL
Qty: 90 | Refills: 3 | Status: ACTIVE | COMMUNITY
Start: 2020-07-24 | End: 1900-01-01

## 2021-07-23 NOTE — HISTORY OF PRESENT ILLNESS
[FreeTextEntry1] : This is a 55 year male who has had slow and weak urination for the last year\par states that wakes 3-4 times per night and also frequency in the daytime\par \par Dr Longo started him on tamsulosin -- stronger stream but still wakes up too often at night\par i started on finasteride last visit\par \par no pain with urination or blood in the urine\par \par sonogram done today Jan 2021-- prostate is 52g, PVR 90ml. \par  \par Dr Meraz urodynamics:\par qmax 7.5\par PVR 0\par high pressure vodigin -- JAIME\par

## 2021-07-23 NOTE — ASSESSMENT
[FreeTextEntry1] : This is a 55 year male who has had slow and weak urination for the last year\par states that wakes 3-4 times per night and also frequency in the daytime\par \par Dr Longo started him on tamsulosin -- stronger stream but still wakes up too often at night\par i started on finasteride last visit\par \par no pain with urination or blood in the urine\par \par sonogram done today Jan 2021-- prostate is 52g, PVR 90ml. \par  \par \par Dr Meraz urodynamics:\par qmax 7.5\par PVR 0\par high pressure vodigin -- JAIME\par

## 2021-09-29 NOTE — ED PROVIDER NOTE - NSCAREINITIATED _GEN_ER
I am ordering blood testing to evaluate fatigue, reevaluate my PSA and reevaluate mild lipids.
Magalys Falk)

## 2022-01-24 ENCOUNTER — APPOINTMENT (OUTPATIENT)
Dept: UROLOGY | Facility: CLINIC | Age: 57
End: 2022-01-24

## 2022-05-03 ENCOUNTER — APPOINTMENT (OUTPATIENT)
Dept: NEUROLOGY | Facility: CLINIC | Age: 57
End: 2022-05-03

## 2022-09-23 LAB
25(OH)D3 SERPL-MCNC: 42.9 NG/ML
ALBUMIN SERPL ELPH-MCNC: 4.4 G/DL
ALP BLD-CCNC: 95 U/L
ALT SERPL-CCNC: 49 U/L
ANION GAP SERPL CALC-SCNC: 13 MMOL/L
AST SERPL-CCNC: 51 U/L
BASOPHILS # BLD AUTO: 0.04 K/UL
BASOPHILS NFR BLD AUTO: 0.7 %
BILIRUB SERPL-MCNC: 0.6 MG/DL
BUN SERPL-MCNC: 17 MG/DL
CALCIUM SERPL-MCNC: 9.3 MG/DL
CHLORIDE SERPL-SCNC: 105 MMOL/L
CHOLEST SERPL-MCNC: 268 MG/DL
CO2 SERPL-SCNC: 23 MMOL/L
CREAT SERPL-MCNC: 0.93 MG/DL
EOSINOPHIL # BLD AUTO: 0.18 K/UL
EOSINOPHIL NFR BLD AUTO: 3 %
ESTIMATED AVERAGE GLUCOSE: 128 MG/DL
GLUCOSE SERPL-MCNC: 102 MG/DL
HBA1C MFR BLD HPLC: 6.1 %
HCT VFR BLD CALC: 49.2 %
HDLC SERPL-MCNC: 66 MG/DL
HGB BLD-MCNC: 15.9 G/DL
IMM GRANULOCYTES NFR BLD AUTO: 0.3 %
LDLC SERPL CALC-MCNC: 174 MG/DL
LYMPHOCYTES # BLD AUTO: 2.11 K/UL
LYMPHOCYTES NFR BLD AUTO: 35.5 %
MAN DIFF?: NORMAL
MCHC RBC-ENTMCNC: 30.6 PG
MCHC RBC-ENTMCNC: 32.3 GM/DL
MCV RBC AUTO: 94.8 FL
MONOCYTES # BLD AUTO: 0.4 K/UL
MONOCYTES NFR BLD AUTO: 6.7 %
NEUTROPHILS # BLD AUTO: 3.19 K/UL
NEUTROPHILS NFR BLD AUTO: 53.8 %
NONHDLC SERPL-MCNC: 201 MG/DL
PLATELET # BLD AUTO: 187 K/UL
POTASSIUM SERPL-SCNC: 4.8 MMOL/L
PROT SERPL-MCNC: 6.9 G/DL
RBC # BLD: 5.19 M/UL
RBC # FLD: 13.4 %
SODIUM SERPL-SCNC: 140 MMOL/L
TRIGL SERPL-MCNC: 140 MG/DL
TSH SERPL-ACNC: 2.02 UIU/ML
WBC # FLD AUTO: 5.94 K/UL